# Patient Record
Sex: MALE
[De-identification: names, ages, dates, MRNs, and addresses within clinical notes are randomized per-mention and may not be internally consistent; named-entity substitution may affect disease eponyms.]

---

## 2019-08-06 PROBLEM — Z00.00 ENCOUNTER FOR PREVENTIVE HEALTH EXAMINATION: Status: ACTIVE | Noted: 2019-08-06

## 2019-08-08 ENCOUNTER — APPOINTMENT (OUTPATIENT)
Dept: ORTHOPEDIC SURGERY | Facility: CLINIC | Age: 79
End: 2019-08-08
Payer: MEDICARE

## 2019-08-08 VITALS — WEIGHT: 176.4 LBS | BODY MASS INDEX: 26.73 KG/M2 | HEIGHT: 68 IN

## 2019-08-08 DIAGNOSIS — M17.11 UNILATERAL PRIMARY OSTEOARTHRITIS, RIGHT KNEE: ICD-10-CM

## 2019-08-08 PROCEDURE — 73562 X-RAY EXAM OF KNEE 3: CPT | Mod: RT

## 2019-08-08 PROCEDURE — 99204 OFFICE O/P NEW MOD 45 MIN: CPT

## 2019-08-08 NOTE — HISTORY OF PRESENT ILLNESS
[de-identified] : Location: Right knee \par Quality: Dull\par Duration:02/2019\par Context: Atraumatic\par Aggravating Factors: walking, bending, stairs, going from sitting to standing \par Conservative treatment: Rest,heat,ice,otc\par Associated Symptoms: Swelling, stiffness, instability, clicking popping \par Prior Studies: n.a \par

## 2019-08-08 NOTE — DISCUSSION/SUMMARY
[de-identified] : Treatment options discussed. The patient will begin a course of physical therapy. Total knee replacement has been discussed. His pain is intermittent. He still able to ride the sawblade and walk. I recommended that he try the therapy first.

## 2019-08-08 NOTE — PHYSICAL EXAM
[de-identified] : Right knee shows mild valgus. There is crepitus there is no joint line pain there is a negative Itzel test there is no evidence of ligament instability range of motion is from 5 225° neurovascular exam is [de-identified] : X-ray of the right knee shows evidence of degenerative arthritis to a moderate degree